# Patient Record
Sex: FEMALE | Race: ASIAN | NOT HISPANIC OR LATINO | URBAN - METROPOLITAN AREA
[De-identification: names, ages, dates, MRNs, and addresses within clinical notes are randomized per-mention and may not be internally consistent; named-entity substitution may affect disease eponyms.]

---

## 2021-12-03 ENCOUNTER — EMERGENCY (EMERGENCY)
Facility: HOSPITAL | Age: 46
LOS: 1 days | Discharge: HOME | End: 2021-12-03
Attending: EMERGENCY MEDICINE | Admitting: EMERGENCY MEDICINE
Payer: COMMERCIAL

## 2021-12-03 VITALS
TEMPERATURE: 98 F | DIASTOLIC BLOOD PRESSURE: 80 MMHG | HEART RATE: 72 BPM | OXYGEN SATURATION: 100 % | SYSTOLIC BLOOD PRESSURE: 120 MMHG | RESPIRATION RATE: 16 BRPM

## 2021-12-03 VITALS
SYSTOLIC BLOOD PRESSURE: 121 MMHG | DIASTOLIC BLOOD PRESSURE: 84 MMHG | OXYGEN SATURATION: 99 % | RESPIRATION RATE: 16 BRPM | HEART RATE: 78 BPM | TEMPERATURE: 98 F

## 2021-12-03 PROCEDURE — 99284 EMERGENCY DEPT VISIT MOD MDM: CPT

## 2021-12-03 RX ORDER — DIAZEPAM 5 MG
5 TABLET ORAL ONCE
Refills: 0 | Status: DISCONTINUED | OUTPATIENT
Start: 2021-12-03 | End: 2021-12-03

## 2021-12-03 RX ORDER — LIDOCAINE 4 G/100G
1 CREAM TOPICAL ONCE
Refills: 0 | Status: COMPLETED | OUTPATIENT
Start: 2021-12-03 | End: 2021-12-03

## 2021-12-03 RX ORDER — OXYCODONE AND ACETAMINOPHEN 5; 325 MG/1; MG/1
1 TABLET ORAL ONCE
Refills: 0 | Status: DISCONTINUED | OUTPATIENT
Start: 2021-12-03 | End: 2021-12-03

## 2021-12-03 RX ORDER — IBUPROFEN 200 MG
1 TABLET ORAL
Qty: 20 | Refills: 0
Start: 2021-12-03 | End: 2021-12-07

## 2021-12-03 RX ORDER — DIAZEPAM 5 MG
1 TABLET ORAL
Qty: 9 | Refills: 0
Start: 2021-12-03 | End: 2021-12-05

## 2021-12-03 RX ORDER — KETOROLAC TROMETHAMINE 30 MG/ML
30 SYRINGE (ML) INJECTION ONCE
Refills: 0 | Status: DISCONTINUED | OUTPATIENT
Start: 2021-12-03 | End: 2021-12-03

## 2021-12-03 RX ORDER — OXYCODONE AND ACETAMINOPHEN 5; 325 MG/1; MG/1
1 TABLET ORAL
Qty: 9 | Refills: 0
Start: 2021-12-03 | End: 2021-12-05

## 2021-12-03 RX ADMIN — LIDOCAINE 1 PATCH: 4 CREAM TOPICAL at 23:27

## 2021-12-03 RX ADMIN — LIDOCAINE 1 PATCH: 4 CREAM TOPICAL at 20:54

## 2021-12-03 RX ADMIN — Medication 30 MILLIGRAM(S): at 20:54

## 2021-12-03 RX ADMIN — Medication 5 MILLIGRAM(S): at 20:55

## 2021-12-03 RX ADMIN — OXYCODONE AND ACETAMINOPHEN 1 TABLET(S): 5; 325 TABLET ORAL at 22:07

## 2021-12-03 NOTE — ED PROVIDER NOTE - OBJECTIVE STATEMENT
46 y/o female no pmh c/o L neck/shoulder pain x1 week. Pt states that pain started gradually and has been worsening. Pt admits to worsening pain with movement and palpation. Pt has been taking motrin with little relieving. Pt admits to tingling in LUE. Denies chest pain, sob, abd pain, n/v/d, numbness, dizziness, syncope, fever or chills.

## 2021-12-03 NOTE — ED PROVIDER NOTE - PATIENT PORTAL LINK FT
You can access the FollowMyHealth Patient Portal offered by [facility] by registering at the following website: [email or web address]. By joining [employer]’s FollowMyHealth portal, you will also be able to view your health information using other applications (apps) compatible with our system.

## 2021-12-03 NOTE — ED PROVIDER NOTE - DISCHARGE REVIEW MATERIAL PRESENTED
Patient presents to ED with c/o left side dental pain for 3 days. Patient is alert and oriented x 4 and in no acute distress at this time. Respirations are at a regular rate, depth, and pattern. Patient updated on plan of care and has no questions or concerns at this time. Call bell within reach. Will continue to monitor. Please reference nursing assessment. Emergency Department Nursing Plan of Care       The Nursing Plan of Care is developed from the Nursing assessment and Emergency Department Attending provider initial evaluation. The plan of care may be reviewed in the ED Provider note.     The Plan of Care was developed with the following considerations:   Patient / Family readiness to learn indicated by:verbalized understanding and successful return demonstration  Persons(s) to be included in education: patient  Barriers to Learning/Limitations:No    Signed     Shu Bains RN    11/3/2019   3:20 PM .

## 2021-12-03 NOTE — ED ADULT NURSE NOTE - CHIEF COMPLAINT QUOTE
c/o left shoulder/arm pain and tingling with  nausea x 1 week. Pain reproducible with any movement. States pain radiates into neck and down left arm. Pt guarding left arm, holding arm.

## 2021-12-03 NOTE — ED ADULT NURSE NOTE - OBJECTIVE STATEMENT
45 yof presents A&Ox4 ambulatory with a steady gait c/o left neck pain radiating down left arm x1 that worsened today. Pt endorses taking motrin at home without any relief. Pt denies any numbness, tingling or recent traum. Respirations even and unlabored, speaking in full sentences wihtout any difficulty, PERRLA intact, pulse motor and sensation present on all extremities, limited range of motion present in left arm due to pain. No redness, swelling or obvious signs of injury present on left arm. Pt denies any chest pain, dyspnea, dizziness, blurry vision, nausea, vomiting or recent trauma. Pt well appearing at this time and in NAD. Medications administered as per order.

## 2021-12-03 NOTE — ED PROVIDER NOTE - CLINICAL SUMMARY MEDICAL DECISION MAKING FREE TEXT BOX
44 y/o female c/o atraumatic L neck/shoulder pain- + tenderness and spasm on exam, no neuro deficits- will treat pain and reassess.

## 2021-12-03 NOTE — ED PROVIDER NOTE - ATTENDING CONTRIBUTION TO CARE
I have seen and examined the patient on the patient´s visit date. I have reviewed the note written by Cristino Guadarrama [facility], on that visit day. I have supervised and participated as necessary in the performance of procedures indicated for patient management and was available at all phases of the patient´s visit when needed. We discussed the history, physical exam findings, management plan, and  medical decision making. I have made my additions, exceptions, and revisions within the chart and I agree with H and P as documented in its entirety. The data and my interpretation of any data collected from labs, interventions and imaging appear below as well as my independent medical decision making and considerations    The patient is a 45y Female who has a past medical and surgery history of   ,   PAST MEDICAL & SURGICAL HISTORY:   PTED with   Vital Signs Last 24 Hrs  T(F): 98.2 HR: 78 BP: 121/84 RR: 16 SpO2: 99% (03 Dec 2021 20:59) (99% - 100%)  PE: as described; my additions and exceptions are noted in the chart    IMPRESSION/RISK:  Dx=cervical radiculopathy probably 2/2 spasm   Consideration include no imaging indicated  Plan  analgesics  d/c with same +ortho followup  RTED PRN I have seen and examined the patient on the patient´s visit date. I have reviewed the note written by Cristino Guadarrama [facility], on that visit day. I have supervised and participated as necessary in the performance of procedures indicated for patient management and was available at all phases of the patient´s visit when needed. We discussed the history, physical exam findings, management plan, and  medical decision making. I have made my additions, exceptions, and revisions within the chart and I agree with H and P as documented in its entirety. The data and my interpretation of any data collected from labs, interventions and imaging appear below as well as my independent medical decision making and considerations    The patient is a 45y Female who has a past medical and surgery history of PTED with shooting sharp pain radiating from the neck to deltoid and lateral L arm as described    Vital Signs Last 24 Hrs  T(F): 98.2 HR: 78 BP: 121/84 RR: 16 SpO2: 99% (03 Dec 2021 20:59) (99% - 100%)  PE: as described; my additions and exceptions are noted in the chart    IMPRESSION/RISK:  Dx=cervical radiculopathy probably 2/2 spasm   Consideration include no imaging indicated  Plan  analgesics  d/c with same +ortho followup  RTED PRN

## 2021-12-03 NOTE — ED PROVIDER NOTE - NSFOLLOWUPINSTRUCTIONS_ED_ALL_ED_FT
We've got you covered from head to toe    Our specialty programs:    Spine Center  [phone]  [email or web address]    Expedited Orthopaedic Care  [phone]  [email or web address]    Sports Program  [phone]  [email or web address]    Concussion Program  [phone]  [email or web address]     (Scheduling) team hours of operation:  Monday through Thursday, 7am to 8:30pm  Friday, 7am to 7pm  Saturday, 8am to 4pm    The following are a list of orthopedica clinics in the surrounding area    [facility]  Orthopedic Surgery  [address] [place] [address]  Phone: [phone]  Fax:   Follow Up Time:     [facility]  Orthopedics  [address] [place] [address]  Phone: [phone]  Fax: [phone]  Follow Up Time:     [facility]  Orthopedics  [address] [place] [address]    Cervical Radiculopathy  WHAT YOU NEED TO KNOW:  Cervical radiculopathy is a painful condition that happens when a spinal nerve in your neck is pinched or irritated.   Vertebral Column   DISCHARGE INSTRUCTIONS:  Medicines: You may need any of the following:  •NSAIDs help decrease swelling and pain. This medicine can be bought without a doctor's order. This medicine can cause stomach bleeding or kidney problems in certain people. If you take blood thinner medicine, always ask your healthcare provider if NSAIDs are safe for you. Always read the medicine label and follow the directions on it before using this medicine.  •Prescription pain medicine helps decrease pain. Do not wait until the pain is severe before you take this medicine.  •Steroids help decrease pain and swelling. These may be given as a pill or as an injection in your neck. You may need more than 1 injection if your symptoms do not improve after the first treatment.   •Take your medicine as directed. Contact your healthcare provider if you think your medicine is not helping or if you have side effects. Tell him of her if you are allergic to any medicine. Keep a list of the medicines, vitamins, and herbs you take. Include the amounts, and when and why you take them. Bring the list or the pill bottles to follow-up visits. Carry your medicine list with you in case of an emergency.  Follow up with your healthcare provider or spine specialist as directed: Write down your questions so you remember to ask them during your visits.   Physical therapy: Your healthcare provider may suggest physical therapy to stretch and strengthen your muscles. Your physical therapist can teach you how to improve your posture and the way you hold your neck. He may also teach you how to be safely active and avoid further injury. He can also help you develop an exercise program that is safe for your back and neck.   Self-care:   •Ice helps decrease swelling and pain. Ice may also help prevent tissue damage. Use an ice pack, or put crushed ice in a plastic bag. Cover it with a towel and place it on your neck for 15 to 20 minutes every hour or as directed.  •Rest when you feel it is needed. Slowly start to do more each day. Return to your daily activities as directed.   •Wear a soft collar. You may be given a soft collar to support your neck while you sleep. Wear the soft collar only as directed.  Cervical Collars   •Do light stretches and regular exercise. Your healthcare provider may suggest light stretches to help decrease stiffness in your neck and arm as you recover. After your pain is controlled, you may benefit from regular exercise. Ask what type of exercise is safe for your back and neck.   •Review your work area. A comfortable work area can help prevent neck strain. Ask your employer for an ergonomic review to check the position of your desk, chair, phone, and computer. Make any necessary adjustments for your comfort.   Contact your healthcare provider or spine specialist if:   •You have a fever.  •You are losing weight without trying.  •Your pain is worse, even with medicine.  •One or both hands feel more numb than before, or you cannot move your fingers well.  •You have questions or concerns about your condition or care.

## 2021-12-06 ENCOUNTER — NON-APPOINTMENT (OUTPATIENT)
Age: 46
End: 2021-12-06

## 2021-12-06 ENCOUNTER — APPOINTMENT (OUTPATIENT)
Dept: ORTHOPEDIC SURGERY | Facility: CLINIC | Age: 46
End: 2021-12-06
Payer: COMMERCIAL

## 2021-12-06 VITALS — BODY MASS INDEX: 25.76 KG/M2 | HEIGHT: 62 IN | WEIGHT: 140 LBS

## 2021-12-06 DIAGNOSIS — M50.30 OTHER CERVICAL DISC DEGENERATION, UNSPECIFIED CERVICAL REGION: ICD-10-CM

## 2021-12-06 DIAGNOSIS — M47.812 SPONDYLOSIS W/OUT MYELOPATHY OR RADICULOPATHY, CERVICAL REGION: ICD-10-CM

## 2021-12-06 DIAGNOSIS — M54.2 CERVICALGIA: ICD-10-CM

## 2021-12-06 DIAGNOSIS — M54.12 RADICULOPATHY, CERVICAL REGION: ICD-10-CM

## 2021-12-06 PROCEDURE — 72040 X-RAY EXAM NECK SPINE 2-3 VW: CPT

## 2021-12-06 PROCEDURE — 99204 OFFICE O/P NEW MOD 45 MIN: CPT

## 2021-12-06 RX ORDER — IBUPROFEN 800 MG/1
800 TABLET, FILM COATED ORAL 3 TIMES DAILY
Qty: 90 | Refills: 1 | Status: ACTIVE | COMMUNITY
Start: 2021-12-06 | End: 1900-01-01

## 2021-12-06 RX ORDER — TRIAMCINOLONE ACETONIDE 0.25 MG/G
0.03 CREAM TOPICAL
Qty: 15 | Refills: 0 | Status: ACTIVE | COMMUNITY
Start: 2021-06-15

## 2021-12-06 RX ORDER — PREDNISONE 10 MG/1
10 TABLET ORAL
Qty: 30 | Refills: 0 | Status: ACTIVE | COMMUNITY
Start: 2021-12-06 | End: 1900-01-01

## 2021-12-06 RX ORDER — OXYCODONE AND ACETAMINOPHEN 5; 325 MG/1; MG/1
5-325 TABLET ORAL
Qty: 9 | Refills: 0 | Status: ACTIVE | COMMUNITY
Start: 2021-12-03

## 2021-12-06 RX ORDER — CYCLOBENZAPRINE HYDROCHLORIDE 5 MG/1
5 TABLET, FILM COATED ORAL
Qty: 90 | Refills: 1 | Status: ACTIVE | COMMUNITY
Start: 2021-12-06 | End: 1900-01-01

## 2021-12-06 RX ORDER — IBUPROFEN 400 MG/1
400 TABLET, FILM COATED ORAL
Qty: 15 | Refills: 0 | Status: ACTIVE | COMMUNITY
Start: 2021-09-14

## 2021-12-06 RX ORDER — CYCLOBENZAPRINE HYDROCHLORIDE 5 MG/1
5 TABLET, FILM COATED ORAL
Qty: 7 | Refills: 0 | Status: COMPLETED | COMMUNITY
Start: 2021-09-14

## 2021-12-06 RX ORDER — DIAZEPAM 5 MG/1
5 TABLET ORAL
Qty: 9 | Refills: 0 | Status: ACTIVE | COMMUNITY
Start: 2021-12-03

## 2021-12-15 ENCOUNTER — APPOINTMENT (OUTPATIENT)
Dept: ORTHOPEDIC SURGERY | Facility: CLINIC | Age: 46
End: 2021-12-15
Payer: COMMERCIAL

## 2021-12-15 VITALS — WEIGHT: 140 LBS | HEIGHT: 62 IN | BODY MASS INDEX: 25.76 KG/M2

## 2021-12-15 DIAGNOSIS — M77.12 LATERAL EPICONDYLITIS, LEFT ELBOW: ICD-10-CM

## 2021-12-15 DIAGNOSIS — M75.42 IMPINGEMENT SYNDROME OF LEFT SHOULDER: ICD-10-CM

## 2021-12-15 PROCEDURE — 73080 X-RAY EXAM OF ELBOW: CPT | Mod: LT

## 2021-12-15 PROCEDURE — 99214 OFFICE O/P EST MOD 30 MIN: CPT

## 2021-12-15 PROCEDURE — 73030 X-RAY EXAM OF SHOULDER: CPT | Mod: LT

## 2021-12-15 NOTE — HISTORY OF PRESENT ILLNESS
[de-identified] : 46yo female presents complaining of left shoulder in all pain for 2-3 weeks. No injury or trauma. She has pain anterior posterior aspect of her shoulder worse with overhead activity reaching behind back. She also localized lateral aspect of all pain started around this time. She tried to rest, use ice. She saw Dr. Swain for her cervical spine is taking Flexeril as needed, prednisone 10 mg as well as ibuprofen 800 mg.\par \par The patient's past medical history, past surgical history, medications, allergies, and social history were reviewed by me today with the patient and documented accordingly. In addition, the patient's family history, which is noncontributory to this visit, was also reviewed.\par

## 2021-12-15 NOTE — DISCUSSION/SUMMARY
[de-identified] : Resolving cervical radiculopathy much improved with steroid taper she has residual tenderness around her upper extremity there is no signs of rotator cuff tearing or structural issues physical therapy.  Follow-up as needed

## 2021-12-15 NOTE — PHYSICAL EXAM
[de-identified] : General Exam\par \par Well developed, well nourished\par No apparent distress\par Oriented to person, place, and time\par Mood: Normal\par Affect: Normal\par Balance and coordination: Normal\par Gait: Normal\par \par Left shoulder exam\par \par Inspection: No swelling, ecchymosis or gross deformity.\par Skin: No masses, No lesions\par Tenderness: No bicipital tenderness, no tenderness to the greater tuberosity/RTC insertion, no anterior shoulder/lesser tuberosity tenderness. No tenderness SC joint, clavicle, AC joint.\par ROM: 160/60/T6\par Impingement tests: Positive Chaidez\par AC Joint: no pain with cross arm testing\par Biceps: Negative speed\par Strength: 5/5 abduction, external rotation, and internal rotation\par Neuro: AIN, PIN, Ulnar nerve motor intact\par Sensation: Intact to light touch in radial, median, ulnar, and axillary nerve distributions\par Vasc: 2+ radial pulse\par \par left elbow exam:\par \par Skin: Clean, dry, intact. No ecchymosis. No swelling. No palpable joint effusion. No gross deformity.\par Tenderness: + tenderness to palpation lateral epicondyle, No medial epicondyle, olecranon, radial head. Pain with resisted wrist ext and supination\par ROM:0-140° full pronation full supination\par Stability: Stable to vaus/valgus stress\par Neuro: Negative tinels at ulnar canal, AIN/PIN/Ulnar nerve in tact to motor/sensation.\par Strength: 5/5 elbow flexion, 5/5 elbow extension, 5/5 supination, 5/5 pronation\par Sensation: In tact to light touch throughout\par Vasc: 2+ radial pulse, <2s cap refill\par  [de-identified] : \par The following radiographs were ordered and read by me during this patients visit. I reviewed each radiograph in detail with the patient and discussed the findings as highlighted below. \par \par 3 views left shoulder 3 views left elbow were obtained today that show no fracture, dislocation. There is no degenerative change seen. There is no malalignment. No obvious osseous abnormality. Otherwise unremarkable.

## 2022-02-03 ENCOUNTER — RESULT REVIEW (OUTPATIENT)
Age: 47
End: 2022-02-03